# Patient Record
Sex: MALE | Race: WHITE | Employment: FULL TIME | URBAN - METROPOLITAN AREA
[De-identification: names, ages, dates, MRNs, and addresses within clinical notes are randomized per-mention and may not be internally consistent; named-entity substitution may affect disease eponyms.]

---

## 2017-01-04 PROBLEM — R10.2 PELVIC PAIN IN MALE: Status: ACTIVE | Noted: 2017-01-04

## 2017-01-04 PROBLEM — N41.1 CHRONIC PROSTATITIS: Status: ACTIVE | Noted: 2017-01-04

## 2017-01-04 PROBLEM — Z80.42 FAMILY HISTORY OF PROSTATE CANCER IN FATHER: Status: ACTIVE | Noted: 2017-01-04

## 2017-01-04 PROBLEM — Z31.69 INFERTILITY COUNSELING: Status: ACTIVE | Noted: 2017-01-04

## 2017-03-30 ENCOUNTER — OFFICE VISIT (OUTPATIENT)
Dept: FAMILY MEDICINE CLINIC | Facility: CLINIC | Age: 32
End: 2017-03-30

## 2017-03-30 VITALS
WEIGHT: 142.81 LBS | DIASTOLIC BLOOD PRESSURE: 80 MMHG | BODY MASS INDEX: 22.42 KG/M2 | RESPIRATION RATE: 16 BRPM | SYSTOLIC BLOOD PRESSURE: 138 MMHG | HEART RATE: 60 BPM | TEMPERATURE: 99 F | HEIGHT: 67 IN

## 2017-03-30 DIAGNOSIS — Z00.00 WELLNESS EXAMINATION: Primary | ICD-10-CM

## 2017-03-30 DIAGNOSIS — Z00.00 LABORATORY EXAM ORDERED AS PART OF ROUTINE GENERAL MEDICAL EXAMINATION: ICD-10-CM

## 2017-03-30 PROCEDURE — 90471 IMMUNIZATION ADMIN: CPT | Performed by: INTERNAL MEDICINE

## 2017-03-30 PROCEDURE — 90715 TDAP VACCINE 7 YRS/> IM: CPT | Performed by: INTERNAL MEDICINE

## 2017-03-30 PROCEDURE — 99385 PREV VISIT NEW AGE 18-39: CPT | Performed by: INTERNAL MEDICINE

## 2017-03-30 NOTE — PATIENT INSTRUCTIONS
Thank you for choosing Maddy Barrera MD at [place] [address]  To Do: Kaur Goncalves  1. Blood work - fasting   2.  Follow up with Dr. Verena Marx - urology as needed  [address] [facility], [facility], [address]  Phone: [phone]     • Please signup for that the insurance company approved your testing, please call Central Scheduling at [phone]  Please allow our office 5 business days to contact you regarding any testing results.     Refill policies:   Allow 3 business days for refills; controlled subs

## 2017-03-30 NOTE — PROGRESS NOTES
[facility] Internal Medicine Office Note  Chief Complaint:   Patient presents with:  Establish Care  Prostatitis: comes and goes -  is seeing a urologist      HPI:   This is a 33year old male new patient coming in for establishing care    He see index is 22.36 kg/(m^2) as calculated from the following:    Height as of this encounter: 67\". Weight as of this encounter: 142 lb 12.8 oz. Vital signs reviewed. Appears stated age, well groomed. Physical Exam   Vitals reviewed.    Constitutional: He >7 YEARS, IM USE    Annual Depression Screen due on 12/17/1983  Annual Physical due on 12/17/1985  Patient/Caregiver Education: Patient/Caregiver Education: There are no barriers to learning. Medical education done.  Outcome: Patient verbalizes understandin

## 2017-04-01 ENCOUNTER — LAB ENCOUNTER (OUTPATIENT)
Dept: LAB | Age: 32
End: 2017-04-01
Attending: INTERNAL MEDICINE
Payer: COMMERCIAL

## 2017-04-01 DIAGNOSIS — Z00.00 LABORATORY EXAM ORDERED AS PART OF ROUTINE GENERAL MEDICAL EXAMINATION: ICD-10-CM

## 2017-04-01 PROCEDURE — 36415 COLL VENOUS BLD VENIPUNCTURE: CPT | Performed by: INTERNAL MEDICINE

## 2017-04-01 PROCEDURE — 80061 LIPID PANEL: CPT | Performed by: INTERNAL MEDICINE

## 2017-04-01 PROCEDURE — 80050 GENERAL HEALTH PANEL: CPT | Performed by: INTERNAL MEDICINE

## 2023-12-31 ENCOUNTER — HOSPITAL ENCOUNTER (EMERGENCY)
Age: 38
Discharge: HOME | End: 2023-12-31
Attending: EMERGENCY MEDICINE
Payer: COMMERCIAL

## 2023-12-31 VITALS
OXYGEN SATURATION: 98 % | RESPIRATION RATE: 14 BRPM | TEMPERATURE: 99 F | BODY MASS INDEX: 21.97 KG/M2 | DIASTOLIC BLOOD PRESSURE: 80 MMHG | WEIGHT: 140 LBS | HEIGHT: 67 IN | HEART RATE: 60 BPM | SYSTOLIC BLOOD PRESSURE: 139 MMHG

## 2023-12-31 DIAGNOSIS — H10.32 ACUTE CONJUNCTIVITIS OF LEFT EYE, UNSPECIFIED ACUTE CONJUNCTIVITIS TYPE: Primary | ICD-10-CM

## 2023-12-31 PROCEDURE — 99283 EMERGENCY DEPT VISIT LOW MDM: CPT

## 2023-12-31 RX ORDER — TETRACAINE HYDROCHLORIDE 5 MG/ML
2 SOLUTION OPHTHALMIC ONCE
Status: COMPLETED | OUTPATIENT
Start: 2023-12-31 | End: 2023-12-31

## 2023-12-31 RX ORDER — TOBRAMYCIN 3 MG/ML
2 SOLUTION/ DROPS OPHTHALMIC EVERY 6 HOURS
Qty: 5 ML | Refills: 0 | Status: SHIPPED | OUTPATIENT
Start: 2023-12-31 | End: 2024-01-07

## 2023-12-31 NOTE — DISCHARGE INSTRUCTIONS
Use antibiotic drops as prescribed warm compresses 3-4 times a day do not wear your contacts until your symptoms completely resolved if no improvement over the next 2 days follow-up with ophthalmology.

## 2024-01-04 ENCOUNTER — HOSPITAL ENCOUNTER (EMERGENCY)
Age: 39
Discharge: HOME | End: 2024-01-04
Attending: EMERGENCY MEDICINE
Payer: COMMERCIAL

## 2024-01-04 VITALS
DIASTOLIC BLOOD PRESSURE: 82 MMHG | SYSTOLIC BLOOD PRESSURE: 132 MMHG | RESPIRATION RATE: 17 BRPM | OXYGEN SATURATION: 95 % | HEIGHT: 67 IN | BODY MASS INDEX: 21.97 KG/M2 | WEIGHT: 140 LBS | HEART RATE: 84 BPM | TEMPERATURE: 99 F

## 2024-01-04 DIAGNOSIS — H10.9 CONJUNCTIVITIS OF BOTH EYES, UNSPECIFIED CONJUNCTIVITIS TYPE: Primary | ICD-10-CM

## 2024-01-04 DIAGNOSIS — B34.9 VIRAL SYNDROME: ICD-10-CM

## 2024-01-04 LAB — SARS-COV-2 RNA RESP QL NAA+PROBE: NOT DETECTED

## 2024-01-04 PROCEDURE — 99283 EMERGENCY DEPT VISIT LOW MDM: CPT

## 2024-01-04 RX ORDER — ERYTHROMYCIN 5 MG/G
1 OINTMENT OPHTHALMIC EVERY 6 HOURS
Qty: 3.5 G | Refills: 0 | Status: SHIPPED | OUTPATIENT
Start: 2024-01-04 | End: 2024-01-11

## 2024-01-04 NOTE — ED PROVIDER NOTES
Patient Seen in: [facility] In [place]      History     Chief Complaint   Patient presents with    Eye Visual Problem     Stated Complaint: Patient was seen in this ER for left eye pain, was told to come back if worse. *    Subjective:   HPI    This is a 38-year-old male no signal past medical history presents with bilateral eye redness.  He was seen in the emergency room on 12/31/2023 with a left crusty eye.  Patient states that he was using tobramycin and he still continues to do so with really no improvement.  He woke up this morning and now the right eye is red and crusty as well.  He also states that he does not feel good he feels some nasal congestion and bodyaches.  No fever.  He states everyone in the house has a cold.  No vomiting or diarrhea.  No chest pain, cough or shortness of breath.  He presents here for further evaluation.    Objective:   Past Medical History:   Diagnosis Date    Prostatitis               History reviewed. No pertinent surgical history.             Social History     Socioeconomic History    Marital status:     Number of children: 1   Occupational History    Occupation:    Tobacco Use    Smoking status: Former     Packs/day: 0.50     Years: 7.00     Additional pack years: 0.00     Total pack years: 3.50     Types: Cigarettes    Smokeless tobacco: Never   Vaping Use    Vaping Use: Never used   Substance and Sexual Activity    Alcohol use: Yes     Comment: Social     Drug use: No    Sexual activity: Yes     Partners: Female   Other Topics Concern    Exercise Yes     Comment: sporadic    Seat Belt Yes     Service No    Blood Transfusions No    Sleep Concern No   Social History Narrative    , one son 3.5, daughter one yo              Review of Systems    Positive for stated complaint: Patient was seen in this ER for left eye pain, was told to come back if worse. *  Other systems are as noted in HPI.  Constitutional and vital signs  reviewed.      All other systems reviewed and negative except as noted above.    Physical Exam     ED Triage Vitals [01/04/24 0626]   /82   Pulse 84   Resp 17   Temp 99 °F (37.2 °C)   Temp src Oral   SpO2 95 %   O2 Device None (Room air)       Current:/82   Pulse 84   Temp 99 °F (37.2 °C) (Oral)   Resp 17   Ht 170.2 cm (5' 7\")   Wt 63.5 kg   SpO2 95%   BMI 21.93 kg/m²         Physical Exam    GENERAL: Awake, alert oriented x3, nontoxic appearing.   SKIN: Normal, warm, and dry.  HEENT:  Pupils equally round and reactive to light.  Bilateral conjunctiva injected left eye greater than rest.  There is crusting around the eyes.  Oropharynx is clear and moist.  No lymphadenopathy.  Lungs: Clear to auscultation bilaterally with no rales, no retractions, and no wheezing.  HEART:  Regular rate and rhythm. S1 and S2. No murmurs, no rubs or gallops.   ABDOMEN: Soft, nontender and nondistended. Normoactive bowel sounds. No rebound. No guarding.   EXTREMITIES: Warm with brisk capillary refill.         ED Course     Labs Reviewed   RAPID SARS-COV-2 BY PCR - Normal                      MDM      This is a 38-year-old male no signal past medical history presents with bilateral eye redness.  Differential includes conjunctivitis viral versus bacterial.      Visual acuity: 20/20 right eye/20/25 left eye corrected    Rapid COVID: Negative    Patient has bilateral conjunctivitis.  Will change tobramycin to with mycin ointment.  Recommend outpatient follow-up with ophthalmology.  He should call today for an appointment.  Continue warm compresses.  Tylenol ibuprofen for pain or bodyaches.  Return if worse.  Follow-up with his primary care physician 2 to 3 days.  Patient discharged home in good condition.    Patient discharged home.  Will check COVID results on MyChart.        Disposition and Plan     Clinical Impression:  1. Conjunctivitis of both eyes, unspecified conjunctivitis type    2. Viral syndrome          Disposition:  Discharge  1/4/2024  6:43 am    Follow-up:  Von Jaeger, DO  [address]  [address]  [facility] [address]  [phone]    Follow up today            Medications Prescribed:  Discharge Medication List as of 1/4/2024  7:04 AM        START taking these medications    Details   erythromycin 5 MG/GM Ophthalmic Ointment Place 1 Application into both eyes every 6 (six) hours for 7 days., Normal, Disp-3.5 g, R-0

## 2024-01-04 NOTE — ED INITIAL ASSESSMENT (HPI)
Patient reports he noticed left eye redness and swelling  and came to ER Sunday, was discharged. Pt now reports worsening redness and swelling to left eye and also to right eye. Pt reports drainage to bilateral eyes, vision blurry only with discharge. Pt wears glasses.

## 2024-01-04 NOTE — DISCHARGE INSTRUCTIONS
Warm compresses to the eye for 5 minutes 5-6 times a day  Changed to erythromycin ointment.  Use the ointment every 4-6 hours  Call ophthalmology today for follow-up  Tylenol or ibuprofen for pain or bodyaches  COVID results are pending check MyChart  Do not wear contacts  Return if worse  Follow-up with your primary care physician next week

## (undated) NOTE — MR AVS SNAPSHOT
[facility] [address]  [address] [facility]  [phone]               Thank you for choosing us for your health care visit with Adrianne Mujica MD.  We are glad to serve you and happy to provide you with [email or web address]    •The Lab is here [address] Mon, Tues, Friday 8am-4pm in office and Wed, Thurs are 7am-3pm, plus most Saturday 830am-12p. call [phone] but walk-in is fine.    •To schedule Imaging or tests at [facility] Schedule 630 Narcotic medications can only be filled in 30 day increments and must be refilled at an office visit only. If your prescription is due for a refill, you may be due for a follow up appointment.   We cannot refill your maintenance medications at a preventati